# Patient Record
Sex: FEMALE | URBAN - METROPOLITAN AREA
[De-identification: names, ages, dates, MRNs, and addresses within clinical notes are randomized per-mention and may not be internally consistent; named-entity substitution may affect disease eponyms.]

---

## 2024-07-19 ENCOUNTER — OFFICE VISIT (OUTPATIENT)
Dept: URBAN - METROPOLITAN AREA CLINIC 115 | Facility: CLINIC | Age: 49
End: 2024-07-19
Payer: COMMERCIAL

## 2024-07-19 ENCOUNTER — LAB OUTSIDE AN ENCOUNTER (OUTPATIENT)
Dept: URBAN - METROPOLITAN AREA CLINIC 115 | Facility: CLINIC | Age: 49
End: 2024-07-19

## 2024-07-19 ENCOUNTER — DASHBOARD ENCOUNTERS (OUTPATIENT)
Age: 49
End: 2024-07-19

## 2024-07-19 VITALS
TEMPERATURE: 97.7 F | WEIGHT: 122.6 LBS | DIASTOLIC BLOOD PRESSURE: 83 MMHG | HEIGHT: 65 IN | BODY MASS INDEX: 20.43 KG/M2 | HEART RATE: 49 BPM | SYSTOLIC BLOOD PRESSURE: 130 MMHG

## 2024-07-19 DIAGNOSIS — R00.2 HEART PALPITATIONS: ICD-10-CM

## 2024-07-19 DIAGNOSIS — Z12.11 SCREEN FOR COLON CANCER: ICD-10-CM

## 2024-07-19 PROBLEM — 312824007: Status: ACTIVE | Noted: 2024-07-19

## 2024-07-19 PROBLEM — 80313002: Status: ACTIVE | Noted: 2024-07-19

## 2024-07-19 PROCEDURE — 99203 OFFICE O/P NEW LOW 30 MIN: CPT

## 2024-07-19 RX ORDER — ATENOLOL 25 MG/1
TAKE 1 TABLET BY MOUTH TWICE A DAY TABLET ORAL
Qty: 180 EACH | Refills: 1 | Status: ACTIVE | COMMUNITY

## 2024-07-19 RX ORDER — MONTELUKAST SODIUM 10 MG/1
TABLET, FILM COATED ORAL
Qty: 90 TABLET | Status: ACTIVE | COMMUNITY

## 2024-07-19 NOTE — HPI-TODAY'S VISIT:
Pt is a 49 y/o F with PMH of asthma, palpitations d/t "heart valve thickening" in clinic for screening colonoscopy.  Denies PMH of MI, stroke, seizures, COPD, unstable angina, pacemaker, defibrillator, use of blood thinners, ESRD.  Reports maternal grandmother with hx of CRC (dx'd in 70s). BMs are qd, complete evacuation sometimes; denies straining, type 4. Heartburn sometimes. Denies abdominal pain, n/v, diarrhea, constipation, blood in stool, dysphagia, odynophagia, unintentional weight loss, change in appetite, early satiety. Denies reg NSAID use. Rare EtOH (socially). Denies tobacco/marijuana use.  Last colonoscopy: never

## 2024-09-06 ENCOUNTER — CLAIMS CREATED FROM THE CLAIM WINDOW (OUTPATIENT)
Dept: URBAN - METROPOLITAN AREA SURGERY CENTER 13 | Facility: SURGERY CENTER | Age: 49
End: 2024-09-06
Payer: COMMERCIAL

## 2024-09-06 DIAGNOSIS — Z12.11 COLON CANCER SCREENING: ICD-10-CM

## 2024-09-06 DIAGNOSIS — Z80.0 BROTHER AT YOUNG AGE FAMILY HISTORY OF COLON CANCER: ICD-10-CM

## 2024-09-06 DIAGNOSIS — D12.2 ADENOMA OF ASCENDING COLON: ICD-10-CM

## 2024-09-06 PROCEDURE — 45385 COLONOSCOPY W/LESION REMOVAL: CPT | Performed by: INTERNAL MEDICINE

## 2024-09-06 PROCEDURE — 00812 ANES LWR INTST SCR COLSC: CPT | Performed by: ANESTHESIOLOGIST ASSISTANT

## 2024-09-06 PROCEDURE — 00812 ANES LWR INTST SCR COLSC: CPT | Performed by: ANESTHESIOLOGY
